# Patient Record
Sex: FEMALE
[De-identification: names, ages, dates, MRNs, and addresses within clinical notes are randomized per-mention and may not be internally consistent; named-entity substitution may affect disease eponyms.]

---

## 2018-01-31 ENCOUNTER — HOSPITAL ENCOUNTER (EMERGENCY)
Dept: HOSPITAL 17 - NEPK | Age: 33
Discharge: HOME | End: 2018-01-31
Payer: COMMERCIAL

## 2018-01-31 VITALS
HEART RATE: 86 BPM | DIASTOLIC BLOOD PRESSURE: 54 MMHG | TEMPERATURE: 98 F | RESPIRATION RATE: 16 BRPM | OXYGEN SATURATION: 95 % | SYSTOLIC BLOOD PRESSURE: 106 MMHG

## 2018-01-31 VITALS — WEIGHT: 125.27 LBS | HEIGHT: 64 IN | BODY MASS INDEX: 21.39 KG/M2

## 2018-01-31 DIAGNOSIS — S90.01XA: ICD-10-CM

## 2018-01-31 DIAGNOSIS — S40.021A: Primary | ICD-10-CM
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
## 2018-01-31 PROCEDURE — 99284 EMERGENCY DEPT VISIT MOD MDM: CPT

## 2018-01-31 PROCEDURE — 73100 X-RAY EXAM OF WRIST: CPT

## 2018-01-31 PROCEDURE — 73060 X-RAY EXAM OF HUMERUS: CPT

## 2018-01-31 PROCEDURE — 73600 X-RAY EXAM OF ANKLE: CPT

## 2018-01-31 NOTE — RADRPT
EXAM DATE/TIME:  01/31/2018 12:32 

 

[facility] COMPARISON:     

No previous studies available for comparison.

 

                     

INDICATIONS :     

Right humerus pain post fall yesterday.

                     

 

MEDICAL HISTORY :     

None.          

 

SURGICAL HISTORY :        

ORIF right calcaneus. ORIF left forearm.

 

ENCOUNTER:     

Initial                                        

 

ACUITY:     

1 day      

 

PAIN SCORE:     

6/10

 

LOCATION:     

Right  humerus

 

FINDINGS:     

Two view examination of the right humerus demonstrates no evidence of fracture or dislocation.  Bony 
mineralization is normal.  The soft tissue structures are intact.

 

CONCLUSION:     No acute fracture.  

 

 

 

 Janes Gomez MD on January 31, 2018 at 13:07           

Board Certified Radiologist.

 This report was verified electronically.

## 2018-01-31 NOTE — PD
HPI


Chief Complaint:  Musculoskeletal Complaint


Time Seen by Provider:  11:38


Travel History


International Travel<30 days:  No


Contact w/Intl Traveler<30days:  No


Traveled to known affect area:  No





History of Present Illness


HPI


This is a 32-year-old female here with right arm, right wrist, right ankle pain 

after she tripped and fell yesterday.  She reports pain with weightbearing and 

range of motion of the arm.  Denies paresthesias or weakness of the extremity.  

Denies head injury or loss of consciousness.  Patient is not anticoagulated.  

Patient has no headache, neck pain, chest pain, shortness breath, abdominal pain

, paresthesia or weakness of the extremity is.  Symptom severity is moderate.  

No alleviating factors





PFSH


Past Medical History


Medical History:  Denies Significant Hx


Pregnant?:  Not Pregnant


LMP:  1/13/18





Social History


Tobacco Use:  No





Allergies-Medications


(Allergen,Severity, Reaction):  


Coded Allergies:  


     Sulfa (Sulfonamide Antibiotics) (Verified  Allergy, Severe, swelling, 1/31/ 18)


Reported Meds & Prescriptions





Reported Meds & Active Scripts


Active


Reported


Proair Hfa 8.5 GM Inh (Albuterol Sulfate) 90 Mcg/Act Aer 2 Puff INH Q4-6H PRN


     108 mcg/actuation








Review of Systems


Except as stated in HPI:  all other systems reviewed are Neg





Physical Exam


Narrative


GENERAL: Alert and well-appearing 32-year-old female


SKIN: Warm and dry.  No areas of ecchymosis 


HEAD: Normocephalic.  Atraumatic


EYES: Pupils equal, round, reactive to light.  EOMs intact.


NECK: Supple, no cervical midline tenderness


CARDIOVASCULAR: Regular rate and rhythm no chest wall tenderness


RESPIRATORY: Breath sounds equal bilaterally. No accessory muscle use.


GASTROINTESTINAL: Abdomen soft, non-tender, nondistended. 


MUSCULOSKELETAL: No cyanosis, or edema.  Right upper extremity; patient has 

tenderness over the proximal humerus and medial aspect of the wrist.  No 

obvious deformity.  Pulses and sensation intact.  Brisk cap refill.  Right 

lower extremity; patient has tenderness and mild swelling to the lateral 

malleolus.  No obvious deformity.  Ankle is stable.  2+ dorsal pedis pulses.  

Normal sensation.  Brisk cap refill.


BACK: Nontender without obvious deformity. No CVA tenderness.








Data


Data


Last Documented VS





Vital Signs








  Date Time  Temp Pulse Resp B/P (MAP) Pulse Ox O2 Delivery O2 Flow Rate FiO2


 


1/31/18 11:03 98.0 86 16 106/54 (71) 95 Room Air  








Orders





 Orders


Humerus (Min 2vws) (1/31/18 )


Wrist, Limited (Ap&Lat) (1/31/18 )


Ankle, Limited (Ap&Lat) (1/31/18 )








MDM


Medical Decision Making


Medical Screen Exam Complete:  Yes


Emergency Medical Condition:  Yes


Differential Diagnosis


Humerus fracture, wrist fracture, fibular fracture, sprain, contusion


Narrative Course


32-year-old female here with right ankle and right upper she may pain after she 

fell yesterday.  Her extremities are neurovascularly intact.





X-ray right humerus: Negative for fracture


X-ray right wrist: Negative for fracture


X-ray right ankle: Negative for fracture





Discuss diagnostic findings with patient.  Patient be treated for contusion.  

Instructed to rest ice elevate the extremities over-the-counter NSAIDs as 

needed for pain.





Diagnosis





 Primary Impression:  


 Contusion of right upper extremity


 Qualified Codes:  S40.021A - Contusion of right upper arm, initial encounter


 Additional Impression:  


 Contusion of right ankle


 Qualified Codes:  S90.01XA - Contusion of right ankle, initial encounter


Referrals:  


Primary Care Physician


Departure Forms:  Tests/Procedures, Work Release   Enter return to work date:  

Feb 1, 2018





***Additional Instructions:  


Ice and elevate the extremity.


Take over-the-counter Tylenol or ibuprofen for pain.


Follow-up with the [facility]


Disposition:  01 DISCHARGE HOME


Condition:  Stable











Tracy Goodwin Jan 31, 2018 12:01

## 2018-01-31 NOTE — RADRPT
EXAM DATE/TIME:  01/31/2018 12:28 

 

[facility] COMPARISON:     

No previous studies available for comparison.

 

                     

INDICATIONS :     

Right wrist pain post fall yesterday.

                     

 

MEDICAL HISTORY :     

None.          

 

SURGICAL HISTORY :        

ORIF right calcaneus. ORIF left forearm.

 

ENCOUNTER:     

Initial                                        

 

ACUITY:     

2 days      

 

PAIN SCORE:     

6/10

 

LOCATION:     

Right  wrist

 

FINDINGS:     

2 views of the right wrist demonstrate no fracture or dislocation. Mineralization is within normal li
mits and there is no significant arthropathy. No soft tissue abnormality or radiopaque foreign body i
s identified.

 

 

CONCLUSION:     

No acute abnormality is identified.

 

 

 

 Tee Fisher MD on January 31, 2018 at 12:58           

Board Certified Radiologist.

 This report was verified electronically.

## 2022-04-07 NOTE — RADRPT
EXAM DATE/TIME:  01/31/2018 12:27 

 

[facility] COMPARISON:     

No previous studies available for comparison.

 

                     

INDICATIONS :     

Right ankle pain & swelling post fall yesterday.

                     

 

MEDICAL HISTORY :     

None.          

 

SURGICAL HISTORY :        

ORIF left forearm. ORIF right calcaneus.

 

ENCOUNTER:     

Initial                                        

 

ACUITY:     

1 day      

 

PAIN SCORE:     

6/10

 

LOCATION:     

Right  anlke

 

FINDINGS:     

AP and lateral views of the right ankle demonstrate no fracture or dislocation. Ankle mortise is inta
ct. There is a bone anchor in the posterior calcaneous related to prior surgery. No soft tissue abnor
mality or concerning radiopaque foreign body is identified.

 

CONCLUSION:     

No acute abnormality is identified.

 

 

 

 

 Tee Fisher MD on January 31, 2018 at 12:58           

Board Certified Radiologist.

 This report was verified electronically. 3 = A little assistance